# Patient Record
Sex: FEMALE | Race: BLACK OR AFRICAN AMERICAN | NOT HISPANIC OR LATINO | Employment: UNEMPLOYED | URBAN - METROPOLITAN AREA
[De-identification: names, ages, dates, MRNs, and addresses within clinical notes are randomized per-mention and may not be internally consistent; named-entity substitution may affect disease eponyms.]

---

## 2023-11-28 ENCOUNTER — OFFICE VISIT (OUTPATIENT)
Dept: OBSTETRICS AND GYNECOLOGY | Facility: CLINIC | Age: 43
End: 2023-11-28
Payer: COMMERCIAL

## 2023-11-28 ENCOUNTER — LAB (OUTPATIENT)
Dept: LAB | Facility: LAB | Age: 43
End: 2023-11-28
Payer: COMMERCIAL

## 2023-11-28 VITALS
BODY MASS INDEX: 36.22 KG/M2 | HEIGHT: 62 IN | DIASTOLIC BLOOD PRESSURE: 84 MMHG | SYSTOLIC BLOOD PRESSURE: 130 MMHG | WEIGHT: 196.8 LBS

## 2023-11-28 DIAGNOSIS — Z11.3 SCREEN FOR STD (SEXUALLY TRANSMITTED DISEASE): Primary | ICD-10-CM

## 2023-11-28 DIAGNOSIS — N93.8 DUB (DYSFUNCTIONAL UTERINE BLEEDING): ICD-10-CM

## 2023-11-28 DIAGNOSIS — N91.2 AMENORRHEA: ICD-10-CM

## 2023-11-28 DIAGNOSIS — Z11.3 SCREEN FOR STD (SEXUALLY TRANSMITTED DISEASE): ICD-10-CM

## 2023-11-28 DIAGNOSIS — Z30.8 ENCOUNTER FOR OTHER CONTRACEPTIVE MANAGEMENT: ICD-10-CM

## 2023-11-28 LAB
DHEA-S SERPL-MCNC: 79 UG/DL (ref 12–379)
EST. AVERAGE GLUCOSE BLD GHB EST-MCNC: 169 MG/DL
HAV IGM SER QL: NONREACTIVE
HBA1C MFR BLD: 7.5 %
HBV CORE IGM SER QL: NONREACTIVE
HBV SURFACE AG SERPL QL IA: NONREACTIVE
HCV AB SER QL: NONREACTIVE
HIV 1+2 AB+HIV1 P24 AG SERPL QL IA: NONREACTIVE
PROLACTIN SERPL-MCNC: 8.4 UG/L (ref 3–20)
T PALLIDUM AB SER QL: NONREACTIVE
TSH SERPL-ACNC: 1.91 MIU/L (ref 0.44–3.98)

## 2023-11-28 PROCEDURE — 87491 CHLMYD TRACH DNA AMP PROBE: CPT

## 2023-11-28 PROCEDURE — 84402 ASSAY OF FREE TESTOSTERONE: CPT

## 2023-11-28 PROCEDURE — 83036 HEMOGLOBIN GLYCOSYLATED A1C: CPT

## 2023-11-28 PROCEDURE — 87591 N.GONORRHOEAE DNA AMP PROB: CPT

## 2023-11-28 PROCEDURE — 84443 ASSAY THYROID STIM HORMONE: CPT

## 2023-11-28 PROCEDURE — 80074 ACUTE HEPATITIS PANEL: CPT

## 2023-11-28 PROCEDURE — 82627 DEHYDROEPIANDROSTERONE: CPT

## 2023-11-28 PROCEDURE — 87389 HIV-1 AG W/HIV-1&-2 AB AG IA: CPT

## 2023-11-28 PROCEDURE — 36415 COLL VENOUS BLD VENIPUNCTURE: CPT

## 2023-11-28 PROCEDURE — 1036F TOBACCO NON-USER: CPT | Performed by: OBSTETRICS & GYNECOLOGY

## 2023-11-28 PROCEDURE — 84146 ASSAY OF PROLACTIN: CPT

## 2023-11-28 PROCEDURE — 84702 CHORIONIC GONADOTROPIN TEST: CPT

## 2023-11-28 PROCEDURE — 86780 TREPONEMA PALLIDUM: CPT

## 2023-11-28 PROCEDURE — 99204 OFFICE O/P NEW MOD 45 MIN: CPT | Performed by: OBSTETRICS & GYNECOLOGY

## 2023-11-28 RX ORDER — LIDOCAINE HCL 4 G/100G
1 CREAM TOPICAL 2 TIMES DAILY
COMMUNITY
Start: 2019-09-24

## 2023-11-28 RX ORDER — MENTHOL 5.8 MG/1
100 LOZENGE ORAL DAILY
COMMUNITY

## 2023-11-28 RX ORDER — CYCLOBENZAPRINE HCL 10 MG
1 TABLET ORAL NIGHTLY
COMMUNITY
Start: 2020-12-02

## 2023-11-28 RX ORDER — TERCONAZOLE 8 MG/G
CREAM VAGINAL
COMMUNITY
Start: 2023-01-06

## 2023-11-28 RX ORDER — METFORMIN HYDROCHLORIDE 500 MG/1
500 TABLET ORAL
COMMUNITY

## 2023-11-28 RX ORDER — MEDROXYPROGESTERONE ACETATE 150 MG/ML
150 INJECTION, SUSPENSION INTRAMUSCULAR
Qty: 1 ML | Refills: 3 | Status: SHIPPED | OUTPATIENT
Start: 2023-11-28

## 2023-11-28 RX ORDER — DOCUSATE SODIUM 100 MG/1
CAPSULE, LIQUID FILLED ORAL 2 TIMES DAILY
COMMUNITY
Start: 2015-09-11

## 2023-11-28 RX ORDER — ACETAMINOPHEN 500 MG
1 TABLET ORAL
COMMUNITY
Start: 2019-09-17

## 2023-11-28 RX ORDER — DULAGLUTIDE 1.5 MG/.5ML
0.5 INJECTION, SOLUTION SUBCUTANEOUS
COMMUNITY
Start: 2023-03-01 | End: 2024-01-31

## 2023-11-28 RX ORDER — FLUTICASONE PROPIONATE 50 MCG
SPRAY, SUSPENSION (ML) NASAL
COMMUNITY
Start: 2022-01-26

## 2023-11-28 RX ORDER — ALBUTEROL SULFATE 90 UG/1
AEROSOL, METERED RESPIRATORY (INHALATION)
COMMUNITY
Start: 2021-07-16

## 2023-11-28 RX ORDER — LORATADINE 10 MG/1
TABLET ORAL
COMMUNITY
Start: 2022-01-11

## 2023-11-28 RX ORDER — IBUPROFEN 800 MG/1
TABLET ORAL
COMMUNITY
Start: 2015-09-11

## 2023-11-28 RX ORDER — PRENATAL VIT 91/IRON/FOLIC/DHA 28-975-200
COMBINATION PACKAGE (EA) ORAL
COMMUNITY
Start: 2023-05-18

## 2023-11-28 RX ORDER — HYDROXYZINE HYDROCHLORIDE 10 MG/1
1 TABLET, FILM COATED ORAL EVERY 6 HOURS PRN
COMMUNITY
Start: 2020-04-28

## 2023-11-28 RX ORDER — ATORVASTATIN CALCIUM 10 MG/1
10 TABLET, FILM COATED ORAL DAILY
COMMUNITY

## 2023-11-28 NOTE — PROGRESS NOTES
Paola Ferreira is a 43 y.o. female who presents with a chief complaint of Metrorrhagia (Patient complains she has not had a period since 2023, would like to talk about getting back on depo)      SUBJECTIVE  Patient presents with multiple complaints.  She would like STD testing done.  She is also complains of not having a period since September.  Her last Depo-Provera shot was in July but she states she got periods regularly even with the Depo.  Her periods of time was very heavy with lots of clotting and she has not bled since.  She did have a negative urine pregnancy test today in the office.  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    Irregular menstruation, unspecified 2015    Irregular menstrual cycle    Other conditions influencing health status 2015    History of pregnancy    Other conditions influencing health status 07/10/2014    History of pregnancy    Painful micturition, unspecified 2015    Pain with urination    Pelvic and perineal pain 2015    Pelvic pain in female    Personal history of gestational diabetes 2015    History of gestational diabetes    Personal history of other benign neoplasm 2015    History of uterine leiomyoma    Personal history of other complications of pregnancy, childbirth and the puerperium     History of spontaneous     Personal history of other complications of pregnancy, childbirth and the puerperium     History of ectopic pregnancy    Personal history of other diseases of the female genital tract 2015     History of female infertility    Personal history of other diseases of the female genital tract 2021    History of abnormal uterine bleeding    Personal history of other infectious and parasitic diseases 2015    History of herpes genitalis    Pregnancy with inconclusive fetal viability, not applicable or unspecified 2015    Pregnancy, location unknown    Severe pre-eclampsia, unspecified trimester     Preeclampsia, severe     Past Surgical History:   Procedure Laterality Date    CERVICAL BIOPSY  W/ LOOP ELECTRODE EXCISION  2015    Cervical Loop Electrosurgical Excision (LEEP)    OTHER SURGICAL HISTORY  2015    Hysteroscopy     Social History     Socioeconomic History    Marital status: Single     Spouse name: None    Number of children: None    Years of education: None    Highest education level: None   Occupational History    None   Tobacco Use    Smoking status: Former     Types: Cigarettes    Smokeless tobacco: Never   Vaping Use    Vaping Use: Never used   Substance and Sexual Activity    Alcohol use: Not Currently    Drug use: Never    Sexual activity: Yes   Other Topics Concern    None   Social History Narrative    None     Social Determinants of Health     Financial Resource Strain: Not on file   Food Insecurity: Not on file   Transportation Needs: Not on file   Physical Activity: Not on file   Stress: Not on file   Social Connections: Not on file   Intimate Partner Violence: Not on file   Housing Stability: Not on file     No family history on file.    OB History    Para Term  AB Living   12 2 2 0 10 2   SAB IAB Ectopic Multiple Live Births   10 0 0 0 2      # Outcome Date GA Lbr Wayne/2nd Weight Sex Delivery Anes PTL Lv   12 Term            11 Term            10 SAB            9 SAB            8 SAB            7 SAB            6 SAB            5 SAB            4 SAB            3 SAB            2 SAB            1 SAB                OBJECTIVE  Allergies   Allergen  "Reactions    Codeine GI Upset and Nausea Only      (Not in a hospital admission)       Review of Systems  History obtained from the patient  General ROS: negative  Psychological ROS: negative  Gastrointestinal ROS: no abdominal pain, change in bowel habits, or black or bloody stools  Musculoskeletal ROS: negative  Physical Exam  General Appearance: awake, alert, oriented, in no acute distress, well developed, well nourished, and in no acute distress  Skin: there are no suspicious lesions or rashes of concern, skin color, texture, turgor are normal; there are no bruises, rashes or lesions.  Head/Face: NCAT  Eyes: No gross abnormalities., PERRL, and EOMI  Abdomen: Soft, non-tender, normal bowel sounds; no bruits, organomegaly or masses.  Extremities: Extremities warm to touch, pink, with no edema.  Musculoskeletal: negative    /84   Ht 1.575 m (5' 2\")   Wt 89.3 kg (196 lb 12.8 oz)   LMP 09/23/2023 (Approximate)   BMI 36.00 kg/m²    Problem List Items Addressed This Visit    None  Visit Diagnoses       Screen for STD (sexually transmitted disease)    -  Primary    Relevant Orders    Neisseria gonorrhoeae, Amplified    Chlamydia Trachomatis, Amplified    HIV 1/2 Antigen/Antibody Screen with Reflex to Confirmation    Hepatitis Panel, Acute    Syphilis Screen with Reflex    DUB (dysfunctional uterine bleeding)        Relevant Orders    TSH    Prolactin    DHEA-sulfate    Testosterone,Free and Total    Hemoglobin A1c    US pelvis transvaginal    Encounter for other contraceptive management        Relevant Medications    medroxyPROGESTERone (Depo-Provera) 150 mg/mL injection               "

## 2023-11-29 LAB — N GONORRHOEA DNA SPEC QL PROBE+SIG AMP: NEGATIVE

## 2023-11-30 DIAGNOSIS — N91.2 AMENORRHEA: Primary | ICD-10-CM

## 2023-11-30 LAB — B-HCG SERPL-ACNC: <3 MIU/ML

## 2023-12-01 ENCOUNTER — ANCILLARY PROCEDURE (OUTPATIENT)
Dept: RADIOLOGY | Facility: CLINIC | Age: 43
End: 2023-12-01
Payer: COMMERCIAL

## 2023-12-01 DIAGNOSIS — N93.8 DUB (DYSFUNCTIONAL UTERINE BLEEDING): ICD-10-CM

## 2023-12-01 LAB — C TRACH RRNA SPEC QL NAA+PROBE: NEGATIVE

## 2023-12-01 PROCEDURE — 76856 US EXAM PELVIC COMPLETE: CPT | Performed by: RADIOLOGY

## 2023-12-01 PROCEDURE — 76830 TRANSVAGINAL US NON-OB: CPT

## 2023-12-01 PROCEDURE — 76830 TRANSVAGINAL US NON-OB: CPT | Performed by: RADIOLOGY

## 2023-12-02 LAB
TESTOSTERONE FREE (CHAN): 1.5 PG/ML (ref 0.1–6.4)
TESTOSTERONE,TOTAL,LC-MS/MS: 13 NG/DL (ref 2–45)

## 2025-01-13 ENCOUNTER — LAB (OUTPATIENT)
Dept: LAB | Facility: LAB | Age: 45
End: 2025-01-13
Payer: COMMERCIAL

## 2025-01-13 ENCOUNTER — OFFICE VISIT (OUTPATIENT)
Dept: OBSTETRICS AND GYNECOLOGY | Facility: CLINIC | Age: 45
End: 2025-01-13
Payer: COMMERCIAL

## 2025-01-13 VITALS
WEIGHT: 196 LBS | SYSTOLIC BLOOD PRESSURE: 135 MMHG | HEART RATE: 80 BPM | DIASTOLIC BLOOD PRESSURE: 90 MMHG | BODY MASS INDEX: 35.85 KG/M2

## 2025-01-13 DIAGNOSIS — N94.6 DYSMENORRHEA: ICD-10-CM

## 2025-01-13 DIAGNOSIS — Z11.3 SCREENING EXAMINATION FOR STI: ICD-10-CM

## 2025-01-13 DIAGNOSIS — Z30.42 ENCOUNTER FOR DEPO-PROVERA CONTRACEPTION: ICD-10-CM

## 2025-01-13 DIAGNOSIS — Z11.3 SCREENING EXAMINATION FOR STI: Primary | ICD-10-CM

## 2025-01-13 DIAGNOSIS — Z01.419 ENCOUNTER FOR ANNUAL ROUTINE GYNECOLOGICAL EXAMINATION: ICD-10-CM

## 2025-01-13 DIAGNOSIS — B37.31 VAGINAL YEAST INFECTION: ICD-10-CM

## 2025-01-13 DIAGNOSIS — B36.9 FUNGAL INFECTION OF SKIN: ICD-10-CM

## 2025-01-13 DIAGNOSIS — Z11.3 ROUTINE SCREENING FOR STI (SEXUALLY TRANSMITTED INFECTION): ICD-10-CM

## 2025-01-13 DIAGNOSIS — L73.2 HIDRADENITIS: ICD-10-CM

## 2025-01-13 DIAGNOSIS — Z12.31 BREAST CANCER SCREENING BY MAMMOGRAM: ICD-10-CM

## 2025-01-13 PROCEDURE — 96372 THER/PROPH/DIAG INJ SC/IM: CPT | Performed by: ADVANCED PRACTICE MIDWIFE

## 2025-01-13 PROCEDURE — 86803 HEPATITIS C AB TEST: CPT

## 2025-01-13 PROCEDURE — 87661 TRICHOMONAS VAGINALIS AMPLIF: CPT | Performed by: ADVANCED PRACTICE MIDWIFE

## 2025-01-13 PROCEDURE — 87389 HIV-1 AG W/HIV-1&-2 AB AG IA: CPT

## 2025-01-13 PROCEDURE — 99396 PREV VISIT EST AGE 40-64: CPT | Performed by: ADVANCED PRACTICE MIDWIFE

## 2025-01-13 PROCEDURE — 87340 HEPATITIS B SURFACE AG IA: CPT

## 2025-01-13 PROCEDURE — 87491 CHLMYD TRACH DNA AMP PROBE: CPT | Performed by: ADVANCED PRACTICE MIDWIFE

## 2025-01-13 PROCEDURE — 86780 TREPONEMA PALLIDUM: CPT

## 2025-01-13 PROCEDURE — 86706 HEP B SURFACE ANTIBODY: CPT

## 2025-01-13 PROCEDURE — 2500000004 HC RX 250 GENERAL PHARMACY W/ HCPCS (ALT 636 FOR OP/ED): Mod: SE | Performed by: ADVANCED PRACTICE MIDWIFE

## 2025-01-13 PROCEDURE — 87205 SMEAR GRAM STAIN: CPT | Performed by: ADVANCED PRACTICE MIDWIFE

## 2025-01-13 RX ORDER — MEDROXYPROGESTERONE ACETATE 150 MG/ML
150 INJECTION, SUSPENSION INTRAMUSCULAR
Status: SHIPPED | OUTPATIENT
Start: 2025-01-13 | End: 2026-03-09

## 2025-01-13 RX ORDER — PRENATAL VIT 91/IRON/FOLIC/DHA 28-975-200
COMBINATION PACKAGE (EA) ORAL 2 TIMES DAILY
Qty: 40 G | Refills: 1 | Status: SHIPPED | OUTPATIENT
Start: 2025-01-13

## 2025-01-13 RX ORDER — CYCLOBENZAPRINE HCL 10 MG
10 TABLET ORAL 3 TIMES DAILY PRN
Qty: 15 TABLET | Refills: 1 | Status: SHIPPED | OUTPATIENT
Start: 2025-01-13

## 2025-01-13 RX ORDER — CHLORHEXIDINE GLUCONATE 40 MG/ML
SOLUTION TOPICAL DAILY PRN
Qty: 125 ML | Refills: 11 | Status: SHIPPED | OUTPATIENT
Start: 2025-01-13 | End: 2026-01-13

## 2025-01-13 RX ORDER — IBUPROFEN 600 MG/1
600 TABLET ORAL EVERY 6 HOURS PRN
Qty: 1 TABLET | Refills: 3 | Status: SHIPPED | OUTPATIENT
Start: 2025-01-13 | End: 2025-01-14

## 2025-01-13 RX ORDER — FLUCONAZOLE 150 MG/1
150 TABLET ORAL ONCE
Qty: 2 TABLET | Refills: 1 | Status: SHIPPED | OUTPATIENT
Start: 2025-01-13 | End: 2025-01-13

## 2025-01-13 RX ADMIN — MEDROXYPROGESTERONE ACETATE 150 MG: 150 INJECTION, SUSPENSION INTRAMUSCULAR at 17:00

## 2025-01-13 ASSESSMENT — PAIN SCALES - GENERAL: PAINLEVEL_OUTOF10: 7

## 2025-01-13 ASSESSMENT — ENCOUNTER SYMPTOMS
GASTROINTESTINAL NEGATIVE: 0
EYES NEGATIVE: 0
ALLERGIC/IMMUNOLOGIC NEGATIVE: 0
PSYCHIATRIC NEGATIVE: 0
CONSTITUTIONAL NEGATIVE: 0
CARDIOVASCULAR NEGATIVE: 0
NEUROLOGICAL NEGATIVE: 0
ENDOCRINE NEGATIVE: 0
MUSCULOSKELETAL NEGATIVE: 0
RESPIRATORY NEGATIVE: 0
HEMATOLOGIC/LYMPHATIC NEGATIVE: 0

## 2025-01-13 NOTE — PROGRESS NOTES
Assessment   PLAN  Thank you for coming to your annual exam. We discussed healthy lifestyle, well woman screening, and other diagnoses listed below.    Paola was seen today for std testing.  Diagnoses and all orders for this visit:  Screening examination for STI (Primary)  -     HIV 1/2 Antigen/Antibody Screen with Reflex to Confirmation; Future  -     Hepatitis C Antibody; Future  -     Hepatitis B Surface Antibody; Future  -     Syphilis Screen with Reflex; Future  -     Trichomonas vaginalis, Amplified  -     C. trachomatis / N. gonorrhoeae, Amplified  Breast cancer screening by mammogram  -     BI mammo bilateral screening tomosynthesis; Future  Dysmenorrhea  -     Discontinue: ibuprofen 600 mg tablet; Take 1 tablet (600 mg) by mouth every 6 hours if needed for mild pain (1 - 3) or moderate pain (4 - 6).  -     cyclobenzaprine (Flexeril) 10 mg tablet; Take 1 tablet (10 mg) by mouth 3 times a day as needed for muscle spasms for up to 30 doses.  Vaginal yeast infection  -     fluconazole (Diflucan) 150 mg tablet; Take 1 tablet (150 mg) by mouth 1 time for 1 dose. Repeat in 7 days if symptoms persist.  -     Vaginitis Gram Stain For Bacterial Vaginosis + Yeast  Fungal infection of skin  -     terbinafine (LamISIL) 1 % cream; Apply topically 2 times a day.  Hidradenitis  -     chlorhexidine (Hibiclens) 4 % external liquid; Apply topically once daily as needed for wound care. Clean areas prone to boils with Hibiclens 3x/week. Keep the area clean and dry between washes.  Encounter for Depo-Provera contraception  -     medroxyPROGESTERone (Depo-Provera) injection 150 mg    Discussed options for contraception given Paola's age and risk factors including cHTN. After discussion of risks/benefits/alternatives, Paola decided to proceed with restarting Depo which she has been happy with in the past. Will continue to monitor mood symptoms and dysmenorrhea and follow up in 4-6 months if she still feels her symptoms are  bothersome.     Please return for your next visit in 1 year.    Katelyn Coleman, CINDY-SIMIN    Subjective     Paola Ferreira is a 44 y.o. female who is here for a routine exam.   PCP = No primary care provider on file.    APE Concerns: endorses frequent yeast infections since DM diagnosis and feels she has one now with irritation and slight odor. Reports she often has to take diflucan and a cream.     Requesting IBU and flexeril which she uses for dysmenorrhea.     Other Concerns: has severe mood changes prior to period, unsure whether these were better while on Depo. Is also requesting refill on Lamasil for athlete's foot.   Also asking for recommendations for hidradenitis of armpits and groin. Has seen dermatology in the past and is not interested in seeing them again as she felt they were unable to help her.     OB History    Para Term  AB Living   12 2 2 0 10 2   SAB IAB Ectopic Multiple Live Births   10 0 0 0 2      # Outcome Date GA Lbr Wayne/2nd Weight Sex Type Anes PTL Lv   12 Term            11 Term            10 SAB            9 SAB            8 SAB            7 SAB            6 SAB            5 SAB            4 SAB            3 SAB            2 SAB            1 SAB                GynHx:  Menarche: 12   Menstrual Pattern: regular monthly periods   STIs: denies  Sexual Activity: men, monogamous, no complaints  Contraception: nothing currently     Pap Hx:   2023 NILM, HPV neg  2018 NILM, HPV neg   Remote h/o LEEP    Preventative:  Last mammogram: no previous mammogram  Last Colonoscopy: due age 47  DM Screening: T2DM  DEXA: due age 67  HPV vaccination: no  Exercise: none regular  Diet: no restrictions  Seat Belt Use: always    Famhx breast/ovarian/colon cancer: paternal grandmother with breast cancer     SoHx:  Substance: No T/D. EtOH social.  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    Irregular menstruation, unspecified 2015    Irregular menstrual cycle    Other conditions influencing health status 2015    History of pregnancy    Other conditions influencing health status 07/10/2014    History of pregnancy    Painful micturition, unspecified 2015    Pain with urination    Pelvic and perineal pain 2015    Pelvic pain in female    Personal history of gestational diabetes 2015    History of gestational diabetes    Personal history of other benign neoplasm 2015    History of uterine leiomyoma    Personal history of other complications of pregnancy, childbirth and the puerperium     History of spontaneous     Personal history of other complications of pregnancy, childbirth and the puerperium     History of ectopic pregnancy    Personal history of other diseases of the female genital tract 2015    History of female infertility    Personal history of other diseases of the female genital tract 2021    History of abnormal uterine bleeding    Personal history of other infectious and parasitic diseases 2015    History of herpes genitalis    Pregnancy with inconclusive fetal viability, not applicable or unspecified 2015    Pregnancy, location unknown    Severe pre-eclampsia, unspecified trimester ([facility])     Preeclampsia, severe       Past Surgical History:   Procedure Laterality Date    CERVICAL BIOPSY  W/ LOOP ELECTRODE EXCISION  2015    Cervical Loop Electrosurgical Excision (LEEP)    OTHER SURGICAL HISTORY  2015    Hysteroscopy       No family history on file.    Objective   /90    Pulse 80   Wt 88.9 kg (196 lb)   LMP 01/08/2025 (Exact Date)   BMI 35.85 kg/m²      General:   Alert and oriented, in no acute distress   Skin: No significant acne. No hirsutism.   Neck: Supple. No visible thyromegaly.    Breast/Axilla: Normal to palpation bilaterally without masses, skin changes, or nipple discharge.    Abdomen: Soft, non-tender, without masses or organomegaly   Vulva: Normal architecture without erythema, masses, or lesions.    Vagina: Normal mucosa without lesions, masses, or atrophy. No abnormal vaginal discharge.    Cervix: Normal without masses, lesions, or signs of cervicitis.    Uterus: Normal mobile, non-enlarged uterus    Adnexa: Normal without masses or lesions   Pelvic Floor No POP noted. No high tone pelvic floor    Psych Normal affect. Normal mood.

## 2025-01-14 DIAGNOSIS — B96.89 BACTERIAL VAGINOSIS: Primary | ICD-10-CM

## 2025-01-14 DIAGNOSIS — Z11.3 ROUTINE SCREENING FOR STI (SEXUALLY TRANSMITTED INFECTION): Primary | ICD-10-CM

## 2025-01-14 DIAGNOSIS — N76.0 BACTERIAL VAGINOSIS: Primary | ICD-10-CM

## 2025-01-14 LAB
C TRACH RRNA SPEC QL NAA+PROBE: NEGATIVE
CLUE CELLS VAG LPF-#/AREA: PRESENT /[LPF]
HBV SURFACE AB SER-ACNC: 100 MIU/ML
HBV SURFACE AG SERPL QL IA: NONREACTIVE
HCV AB SER QL: NONREACTIVE
HIV 1+2 AB+HIV1 P24 AG SERPL QL IA: NONREACTIVE
N GONORRHOEA DNA SPEC QL PROBE+SIG AMP: NEGATIVE
NUGENT SCORE: 8
T VAGINALIS RRNA SPEC QL NAA+PROBE: NEGATIVE
TREPONEMA PALLIDUM IGG+IGM AB [PRESENCE] IN SERUM OR PLASMA BY IMMUNOASSAY: NONREACTIVE
YEAST VAG WET PREP-#/AREA: ABNORMAL

## 2025-01-14 RX ORDER — METRONIDAZOLE 500 MG/1
500 TABLET ORAL 2 TIMES DAILY
Qty: 14 TABLET | Refills: 0 | Status: SHIPPED | OUTPATIENT
Start: 2025-01-14 | End: 2025-01-21

## 2025-01-14 RX ORDER — IBUPROFEN 600 MG/1
TABLET ORAL
Qty: 30 TABLET | Refills: 3 | Status: SHIPPED | OUTPATIENT
Start: 2025-01-14

## 2025-05-08 ENCOUNTER — APPOINTMENT (OUTPATIENT)
Dept: OBSTETRICS AND GYNECOLOGY | Facility: CLINIC | Age: 45
End: 2025-05-08
Payer: COMMERCIAL

## 2025-05-08 VITALS
DIASTOLIC BLOOD PRESSURE: 82 MMHG | HEIGHT: 62 IN | BODY MASS INDEX: 37.32 KG/M2 | WEIGHT: 202.8 LBS | SYSTOLIC BLOOD PRESSURE: 129 MMHG

## 2025-05-08 DIAGNOSIS — N93.8 DUB (DYSFUNCTIONAL UTERINE BLEEDING): Primary | ICD-10-CM

## 2025-05-08 PROCEDURE — 3008F BODY MASS INDEX DOCD: CPT | Performed by: NURSE PRACTITIONER

## 2025-05-08 PROCEDURE — 99213 OFFICE O/P EST LOW 20 MIN: CPT | Performed by: NURSE PRACTITIONER

## 2025-05-08 RX ORDER — TRANEXAMIC ACID 650 MG/1
1300 TABLET ORAL 3 TIMES DAILY
Qty: 30 TABLET | Refills: 0 | Status: SHIPPED | OUTPATIENT
Start: 2025-05-08 | End: 2025-05-13

## 2025-05-08 RX ORDER — NORETHINDRONE 0.35 MG/1
1 TABLET ORAL DAILY
Qty: 28 TABLET | Refills: 11 | Status: SHIPPED | OUTPATIENT
Start: 2025-05-08 | End: 2026-05-08

## 2025-05-08 ASSESSMENT — ENCOUNTER SYMPTOMS
SLEEP DISTURBANCE: 1
OCCASIONAL FEELINGS OF UNSTEADINESS: 0
BACK PAIN: 1
DEPRESSION: 0
NERVOUS/ANXIOUS: 1
CHEST TIGHTNESS: 1
LOSS OF SENSATION IN FEET: 0
HEADACHES: 1

## 2025-05-08 NOTE — PROGRESS NOTES
Subjective   Patient ID: Paola Ferreira is a 44 y.o. female who presents for Menstrual Problem (Pt is here for excessive menstrual bleeding since 4/28/25. Pt has no other concern or issue at this time./No Pain/No fall/LMP 4/28/25 and has not stopped/Pt declined chaperone/[facility]).  HPI  44-year-old patient new to this office here today reporting excessive menstrual bleeding.  Patient reports in January of this year she received a Depo-Provera shot which was given to help decrease her menstrual bleeding.  She states she stopped bleeding after receiving the shot but began again in the end of April.    She did not return for an additional Depo-Provera shot as she did not like it.  Previously she had an IUD in place that fell out on its own and she also had a progesterone only implant that caused her to bleed randomly.    Patient's medical history is complicated by diabetes and high blood pressure.    Chart review shows ultrasound was completed 1 year ago showing very small 1 cm fibroids a normal endometrial lining but the possibility of adenomyosis.    Options discussed with patient including returning with MD partner to discuss a uterine ablation or other surgical interventions.  Patient is not interested.  Other safe medical options were discussed.  Review of Systems   Respiratory:  Positive for chest tightness.    Genitourinary:  Positive for menstrual problem and vaginal bleeding.   Musculoskeletal:  Positive for back pain.   Neurological:  Positive for headaches.   Psychiatric/Behavioral:  Positive for sleep disturbance. The patient is nervous/anxious.    All other systems reviewed and are negative.      Objective   Physical Exam  Pulmonary:      Effort: Pulmonary effort is normal.   Musculoskeletal:      Cervical back: Normal range of motion.   Neurological:      Mental Status: She is alert.         Assessment/Plan   Problem List Items Addressed This Visit    None  Visit Diagnoses         Codes      DUB  (dysfunctional uterine bleeding)    -  Primary N93.8    Relevant Medications    tranexamic acid (Lysteda) 650 mg tablet    norethindrone (Micronor) 0.35 mg tablet        If bleeding is not controled, suggest she return with MD partner to discuss other options         Lucy Wooten, CINDY-CNP 05/08/25 12:16 PM

## 2025-06-02 ENCOUNTER — APPOINTMENT (OUTPATIENT)
Dept: OBSTETRICS AND GYNECOLOGY | Facility: CLINIC | Age: 45
End: 2025-06-02
Payer: COMMERCIAL